# Patient Record
Sex: FEMALE | Race: OTHER | Employment: FULL TIME | URBAN - METROPOLITAN AREA
[De-identification: names, ages, dates, MRNs, and addresses within clinical notes are randomized per-mention and may not be internally consistent; named-entity substitution may affect disease eponyms.]

---

## 2024-07-10 ENCOUNTER — TELEPHONE (OUTPATIENT)
Age: 59
End: 2024-07-10

## 2024-07-10 NOTE — TELEPHONE ENCOUNTER
Caller: Nurse  - Neelima     Doctor: Valentin     Reason for call: Missing mri for patient she was seen today patient states she had an mri four months ago nurse is unable to find the script     Please advise     Call back#: [phone]

## 2024-09-30 ENCOUNTER — HOSPITAL ENCOUNTER (EMERGENCY)
Facility: HOSPITAL | Age: 59
Discharge: HOME | End: 2024-09-30
Attending: EMERGENCY MEDICINE
Payer: COMMERCIAL

## 2024-09-30 VITALS
RESPIRATION RATE: 18 BRPM | DIASTOLIC BLOOD PRESSURE: 85 MMHG | TEMPERATURE: 98.3 F | OXYGEN SATURATION: 100 % | HEART RATE: 93 BPM | WEIGHT: 154.6 LBS | SYSTOLIC BLOOD PRESSURE: 121 MMHG

## 2024-09-30 DIAGNOSIS — G90.50 CRPS (COMPLEX REGIONAL PAIN SYNDROME TYPE I): Primary | ICD-10-CM

## 2024-09-30 PROCEDURE — 99284 EMERGENCY DEPT VISIT MOD MDM: CPT | Performed by: EMERGENCY MEDICINE

## 2024-09-30 PROCEDURE — 99282 EMERGENCY DEPT VISIT SF MDM: CPT

## 2024-09-30 RX ORDER — GABAPENTIN 300 MG/1
300 CAPSULE ORAL 3 TIMES DAILY
Qty: 90 CAPSULE | Refills: 0 | Status: SHIPPED | OUTPATIENT
Start: 2024-09-30 | End: 2024-10-30

## 2024-09-30 NOTE — Clinical Note
Juana MendezReyesDeGil was seen and treated in our emergency department on 9/30/2024.                Diagnosis:     Merle  may return to work on return date.    She may return on this date: 10/07/2024         If you have any questions or concerns, please don't hesitate to call.      Louie Smith MD    ______________________________           _______________          _______________  Hospital Representative                              Date                                Time

## 2024-10-01 NOTE — ED PROVIDER NOTES
"Final diagnoses:   CRPS (complex regional pain syndrome type I)     ED Disposition       ED Disposition   Discharge    Condition   Stable    Date/Time   Mon Sep 30, 2024  4:16 PM    Comment   Juana MendezReyesDeGil discharge to home/self care.                   Assessment & Plan       Medical Decision Making  59-year-old female with neuropathic pain of the right ankle.  Likely complex regional pain syndrome.  Patient will need to see pain management for proper diagnosis and further management.  Started on gabapentin.    Risk  Prescription drug management.             Medications - No data to display    ED Risk Strat Scores                           SBIRT 22yo+      Flowsheet Row Most Recent Value   Initial Alcohol Screen: US AUDIT-C     1. How often do you have a drink containing alcohol? 0 Filed at: 2024 4852   2. How many drinks containing alcohol do you have on a typical day you are drinking?  0 Filed at: 2024 7320   3a. Male UNDER 65: How often do you have five or more drinks on one occasion? 0 Filed at: 2024 4378   3b. FEMALE Any Age, or MALE 65+: How often do you have 4 or more drinks on one occassion? 0 Filed at: 2024 953   Audit-C Score 0 Filed at: 2024 0162   MAX: How many times in the past year have you...    Used an illegal drug or used a prescription medication for non-medical reasons? Never Filed at: 2024 4553                            History of Present Illness       Chief Complaint   Patient presents with    Ankle Pain     Patient reports a bad fall 1yr ago (is on workers comp for injury), complains of right ankle and \"sometimes my knee gives out\". Takes meloxicam on and off. Requesting a work note.       Past Medical History:   Diagnosis Date    Hypertension       Past Surgical History:   Procedure Laterality Date     SECTION        History reviewed. No pertinent family history.   Social History     Tobacco Use    Smoking status: Never     Passive " exposure: Never    Smokeless tobacco: Never   Vaping Use    Vaping status: Never Used   Substance Use Topics    Alcohol use: Never    Drug use: Never      E-Cigarette/Vaping    E-Cigarette Use Never User       E-Cigarette/Vaping Substances      I have reviewed and agree with the history as documented.     59-year-old female presenting to the emerged department with numbness tingling hot cold sensation and pain of the right lateral ankle that is been going on for 1 year.  Patient has to be started after trauma to the ankle that she has been seeing a podiatrist for.  This was workplace injury and she has been following through Worker's Comp. physicians.  Notes that while the structural issues in the ankle have resolved pain persists and the burning sensation as well as paresthesias radiate from the right lateral ankle up to her knee and thigh.        Review of Systems   Constitutional:  Negative for chills and fever.   HENT:  Negative for ear pain and sore throat.    Eyes:  Negative for pain and visual disturbance.   Respiratory:  Negative for cough and shortness of breath.    Cardiovascular:  Negative for chest pain and palpitations.   Gastrointestinal:  Negative for abdominal pain and vomiting.   Genitourinary:  Negative for dysuria and hematuria.   Musculoskeletal:  Positive for arthralgias. Negative for back pain.   Skin:  Negative for color change and rash.   Neurological:  Negative for seizures and syncope.   All other systems reviewed and are negative.          Objective       ED Triage Vitals   Temperature Pulse Blood Pressure Respirations SpO2 Patient Position - Orthostatic VS   09/30/24 1603 09/30/24 1603 09/30/24 1603 09/30/24 1603 09/30/24 1603 --   98.3 °F (36.8 °C) 93 121/85 18 100 %       Temp Source Heart Rate Source BP Location FiO2 (%) Pain Score    09/30/24 1603 09/30/24 1603 09/30/24 1603 -- 09/30/24 1559    Oral Monitor Left arm  8      Vitals      Date and Time Temp Pulse SpO2 Resp BP Pain Score  FACES Pain Rating User   09/30/24 1603 98.3 °F (36.8 °C) 93 100 % 18 121/85 -- -- CJ   09/30/24 1559 -- -- -- -- -- 8 --             Physical Exam  Vitals and nursing note reviewed.   Constitutional:       General: She is not in acute distress.     Appearance: She is well-developed.   HENT:      Head: Normocephalic and atraumatic.   Eyes:      Conjunctiva/sclera: Conjunctivae normal.   Cardiovascular:      Rate and Rhythm: Normal rate and regular rhythm.      Heart sounds: No murmur heard.  Pulmonary:      Effort: Pulmonary effort is normal. No respiratory distress.      Breath sounds: Normal breath sounds.   Abdominal:      Palpations: Abdomen is soft.      Tenderness: There is no abdominal tenderness.   Musculoskeletal:         General: No swelling.      Cervical back: Neck supple.   Skin:     General: Skin is warm and dry.      Capillary Refill: Capillary refill takes less than 2 seconds.   Neurological:      Mental Status: She is alert.   Psychiatric:         Mood and Affect: Mood normal.         Results Reviewed       None            No orders to display       Procedures    ED Medication and Procedure Management   None     Discharge Medication List as of 9/30/2024  4:19 PM        START taking these medications    Details   gabapentin (Neurontin) 300 mg capsule Take 1 capsule (300 mg total) by mouth 3 (three) times a day For post-herpetic neuralgia: Take 1 tablet on day 1,  Then take 2 tablets on day 2, Then take 3 tablets on day 3 and every day after that as instructed by your doctor., Starting Mon 9/30/2024,  Until Wed 10/30/2024, Normal             ED SEPSIS DOCUMENTATION   Time reflects when diagnosis was documented in both MDM as applicable and the Disposition within this note       Time User Action Codes Description Comment    9/30/2024  4:15 PM Louie Smith Add [G90.50] CRPS (complex regional pain syndrome type I)                  Louie Smith MD  10/01/24 3032

## 2024-12-26 ENCOUNTER — OFFICE VISIT (OUTPATIENT)
Dept: PHYSICAL THERAPY | Facility: CLINIC | Age: 59
End: 2024-12-26
Payer: OTHER MISCELLANEOUS

## 2024-12-26 DIAGNOSIS — M54.31 RIGHT SIDED SCIATICA: Primary | ICD-10-CM

## 2024-12-26 PROCEDURE — 97110 THERAPEUTIC EXERCISES: CPT

## 2024-12-26 PROCEDURE — 97112 NEUROMUSCULAR REEDUCATION: CPT

## 2024-12-26 PROCEDURE — 97010 HOT OR COLD PACKS THERAPY: CPT

## 2024-12-26 NOTE — PROGRESS NOTES
"Daily Note     Today's date: 2024  Patient name: Juana MendezReyesDeGil  : 1965  MRN: [id number]  Referring provider: Todd Guillermo P*  Dx:   Encounter Diagnosis     ICD-10-CM    1. Right sided sciatica  M54.31           Start Time: 1400  Stop Time: 1440  Total time in clinic (min): 40 minutes    Subjective: Pt reports that she is having increased pain in her low back and down her right leg coming into today's session.      Objective: See treatment diary below      Assessment: Pt tolerated treatment well. Pt deferred bike at start of PT session, noting that she was in a lot of pain coming into session, and preferred to start with heat to attempt to decrease symptom intensity. MHP was performed at start of PT session, with heat being applied in seated due to pt not wanting to lay prone. Due to pt not wanting to lay prone, manual interventions were deferred during today's session. Remainder of PT session was focused mainly of stretching and reducing overall symptom intensity in her low back and right leg. Patient exhibited good technique with therapeutic exercises and would benefit from continued PT      Plan: Continue per plan of care.  Progress treatment as tolerated.       Precautions: Israeli Speaking,       Date     Visit # IE 2 3 4 5 6 7 8 9    FOTO IE     NV    XXX    Re-eval IE              Manuals     Paraspinal STM NV PWK  PK PWK JAB NV time PK PWK Def     Lumbar Gapping NV PWK gr 3 np          LE STM NV PWK PK PWK JAB NV time PK bar/posterior PWK  def    Ice Massager    PWK JAB DC!! --      Neuro Re-Ed     TA activation NV 15x3\"  20 x 3\"   5\" x 20 5\" x 20  20x5\"     Bridges NV 20x GTB 20x GTB 20x GTB  20x 3\"no band 20x 3\"no band      Clamshells NV 20x GTB 20x GTB 20x GTB hooklying  20x 3\" GTB hooklying 20x 3\" GTB hooklying      Seated Calf " "Str 2x30\" R    P!  Supine strap 30\" x 3 R  3x30\" supine    Hamstring str 2x30\" R  2x30\" R 2x30\" R P!  Supine strap 30\" x 3 R  3x30\" supine    Slump Slider 20x       20x 20x    Piriformis Str 2x30\"             Standing Flexion Str             SB Rollout NV 10x3\" 3-way 10x3\" 3-way 10x3\"  3-way  10x3\"  3-way 10x3\"  3-way  10x3\"  3-way    Birdbog             Deadbug             Ankle ABCs             HR/TR             Ther Ex 11/7 11/20 11/22 11/27 12/6 12/13 12/19 12/26    Bike NV 6' 6' 8' 8' np np 6'     LTR 15x3\" ea       15x3\"  15x3\"     Hip Flexor Sr NV 3x30\" R     3x30\" R      ITB Str NV 3x30\" R           SKTC 5x5\"      5\" x 5 ea 10\" x 5 ea 15x5\" ea 15x5\" ea    DKTC             Ankle 4-way NV 20x ea GTB           Towel Schrunches             SLR NV 30x R  30x R P! 15x ea 20x ea      S/L Hip Abd    30x R         Side Plank             Pallof Press    15x3\" 7#         DL Leg Press             SL Leg Press             Side-Stepping             Monster Walks                          Ther Activity 11/7 11/20 11/22 12/6 12/13 12/19     Squatting             Step-Ups             Gait Training 11/7 11/20 11/22 12/6 12/13 12/19                               Modalities 11/7 11/20 11/22 12/6 12/13 12/19 12/26    MHP     10' post Pre 10' Pre 10' 10' 10'                                       "

## 2025-02-25 DIAGNOSIS — I10 PRIMARY HYPERTENSION: ICD-10-CM

## 2025-02-26 RX ORDER — LISINOPRIL AND HYDROCHLOROTHIAZIDE 12.5; 2 MG/1; MG/1
TABLET ORAL
Qty: 90 TABLET | Refills: 1 | Status: SHIPPED | OUTPATIENT
Start: 2025-02-26

## 2025-02-28 ENCOUNTER — OFFICE VISIT (OUTPATIENT)
Dept: FAMILY MEDICINE CLINIC | Facility: CLINIC | Age: 60
End: 2025-02-28
Payer: COMMERCIAL

## 2025-02-28 VITALS
OXYGEN SATURATION: 99 % | HEART RATE: 79 BPM | TEMPERATURE: 97.2 F | RESPIRATION RATE: 20 BRPM | DIASTOLIC BLOOD PRESSURE: 64 MMHG | SYSTOLIC BLOOD PRESSURE: 132 MMHG | WEIGHT: 157.2 LBS | HEIGHT: 61 IN | BODY MASS INDEX: 29.68 KG/M2

## 2025-02-28 DIAGNOSIS — Z12.31 SCREENING MAMMOGRAM FOR BREAST CANCER: ICD-10-CM

## 2025-02-28 DIAGNOSIS — I10 PRIMARY HYPERTENSION: Primary | ICD-10-CM

## 2025-02-28 DIAGNOSIS — Z12.11 SCREENING FOR COLON CANCER: ICD-10-CM

## 2025-02-28 DIAGNOSIS — G89.29 CHRONIC PAIN OF RIGHT ANKLE: ICD-10-CM

## 2025-02-28 DIAGNOSIS — M25.571 CHRONIC PAIN OF RIGHT ANKLE: ICD-10-CM

## 2025-02-28 PROBLEM — S96.911A SPRAIN AND STRAIN OF RIGHT ANKLE: Status: RESOLVED | Noted: 2023-12-13 | Resolved: 2025-02-28

## 2025-02-28 PROBLEM — S93.401A SPRAIN AND STRAIN OF RIGHT ANKLE: Status: RESOLVED | Noted: 2023-12-13 | Resolved: 2025-02-28

## 2025-02-28 PROCEDURE — 99214 OFFICE O/P EST MOD 30 MIN: CPT | Performed by: PHYSICIAN ASSISTANT

## 2025-02-28 RX ORDER — MAGNESIUM L-LACTATE 84 MG
84 TABLET, EXTENDED RELEASE ORAL DAILY
Qty: 30 TABLET | Refills: 0 | Status: SHIPPED | OUTPATIENT
Start: 2025-02-28

## 2025-02-28 NOTE — PROGRESS NOTES
"Name: Juana MendezReyesDeGil      : 1965      MRN: [id number]  Encounter Provider: Marcella Ruiz PA-C  Encounter Date: 2025   Encounter department: [facility] CARE [facility]  :  Assessment & Plan  Primary hypertension  Issues with refills, has not taken medication in about 1 week, now with refill ready for . Continue same dose lisinopril/HCTZ.     BP Readings from Last 3 Encounters:   25 132/64   24 120/78   10/29/24 120/78       Orders:  •  magnesium (MAGTAB) 84 MG (7MEQ) TBCR; Take 1 tablet (84 mg total) by mouth daily    Screening for colon cancer    Orders:  •  Cologuard    Screening mammogram for breast cancer    Orders:  •  Mammo screening bilateral w 3d and cad; Future    Chronic pain of right ankle  Following with podiatry, no longer on NSAIDs.               History of Present Illness   Gia is a 59 y.o. female with hx of HTN who present for routine follow-up. Issues with cologuard box, was on phone with them for entire day, did not know how to send it was frustrated and gave up. Issues with pharmacy refill of medication. Would like vitamin because feels weak after anti-inflammatory medications for foot/ankle pain issue, resolved now. No GI issues. Forgot to do labs, will complete.     History was conducted in Malay without the use of ,  was offered to patient and was declined by patient.         Review of Systems   Constitutional:  Negative for fever and unexpected weight change.   Respiratory:  Negative for shortness of breath.    Cardiovascular:  Negative for chest pain.   Musculoskeletal:  Positive for myalgias.       Objective   /64 (BP Location: Left arm, Patient Position: Sitting, Cuff Size: Standard)   Pulse 79   Temp (!) 97.2 °F (36.2 °C) (Temporal)   Resp 20   Ht 5' 1\" (1.549 m)   Wt 71.3 kg (157 lb 3.2 oz)   SpO2 99%   BMI 29.70 kg/m²      Physical Exam  Vitals reviewed.   Constitutional:       " Appearance: Normal appearance.   HENT:      Head: Normocephalic and atraumatic.   Cardiovascular:      Rate and Rhythm: Normal rate and regular rhythm.   Pulmonary:      Effort: Pulmonary effort is normal.      Breath sounds: Normal breath sounds.   Neurological:      Mental Status: She is alert and oriented to person, place, and time. Mental status is at baseline.

## 2025-02-28 NOTE — ASSESSMENT & PLAN NOTE
Issues with refills, has not taken medication in about 1 week, now with refill ready for . Continue same dose lisinopril/HCTZ.     BP Readings from Last 3 Encounters:   02/28/25 132/64   12/11/24 120/78   10/29/24 120/78       Orders:  •  magnesium (MAGTAB) 84 MG (7MEQ) TBCR; Take 1 tablet (84 mg total) by mouth daily

## 2025-03-24 DIAGNOSIS — I10 PRIMARY HYPERTENSION: ICD-10-CM

## 2025-03-26 RX ORDER — MAGNESIUM L-LACTATE 84 MG
84 TABLET, EXTENDED RELEASE ORAL DAILY
Qty: 90 TABLET | Refills: 1 | Status: SHIPPED | OUTPATIENT
Start: 2025-03-26

## 2025-06-13 ENCOUNTER — VBI (OUTPATIENT)
Dept: ADMINISTRATIVE | Facility: OTHER | Age: 60
End: 2025-06-13

## 2025-06-13 NOTE — TELEPHONE ENCOUNTER
06/13/25 12:57 PM     Chart reviewed for Mammogram ; nothing is submitted to the patient's insurance at this time.     Chanda Curtis MA   [facility]